# Patient Record
Sex: FEMALE | Race: OTHER | URBAN - METROPOLITAN AREA
[De-identification: names, ages, dates, MRNs, and addresses within clinical notes are randomized per-mention and may not be internally consistent; named-entity substitution may affect disease eponyms.]

---

## 2017-07-07 ENCOUNTER — IMPORTED ENCOUNTER (OUTPATIENT)
Dept: URBAN - METROPOLITAN AREA CLINIC 50 | Facility: CLINIC | Age: 72
End: 2017-07-07

## 2017-12-11 ENCOUNTER — IMPORTED ENCOUNTER (OUTPATIENT)
Dept: URBAN - METROPOLITAN AREA CLINIC 50 | Facility: CLINIC | Age: 72
End: 2017-12-11

## 2018-02-09 ENCOUNTER — IMPORTED ENCOUNTER (OUTPATIENT)
Dept: URBAN - METROPOLITAN AREA CLINIC 50 | Facility: CLINIC | Age: 73
End: 2018-02-09

## 2018-02-13 ENCOUNTER — IMPORTED ENCOUNTER (OUTPATIENT)
Dept: URBAN - METROPOLITAN AREA CLINIC 50 | Facility: CLINIC | Age: 73
End: 2018-02-13

## 2018-04-17 ENCOUNTER — IMPORTED ENCOUNTER (OUTPATIENT)
Dept: URBAN - METROPOLITAN AREA CLINIC 50 | Facility: CLINIC | Age: 73
End: 2018-04-17

## 2018-04-20 ENCOUNTER — IMPORTED ENCOUNTER (OUTPATIENT)
Dept: URBAN - METROPOLITAN AREA CLINIC 50 | Facility: CLINIC | Age: 73
End: 2018-04-20

## 2018-05-02 ENCOUNTER — IMPORTED ENCOUNTER (OUTPATIENT)
Dept: URBAN - METROPOLITAN AREA CLINIC 50 | Facility: CLINIC | Age: 73
End: 2018-05-02

## 2018-05-25 ENCOUNTER — IMPORTED ENCOUNTER (OUTPATIENT)
Dept: URBAN - METROPOLITAN AREA CLINIC 50 | Facility: CLINIC | Age: 73
End: 2018-05-25

## 2018-05-30 ENCOUNTER — IMPORTED ENCOUNTER (OUTPATIENT)
Dept: URBAN - METROPOLITAN AREA CLINIC 50 | Facility: CLINIC | Age: 73
End: 2018-05-30

## 2018-05-30 NOTE — PATIENT DISCUSSION
"""S/P IOL OS: Krista [id number] 26.5 (ORA) @ 90Âº +ORA/Femto +TM/Omidria. Continue post operative instructions and drops per schedule.  """

## 2018-05-31 ENCOUNTER — IMPORTED ENCOUNTER (OUTPATIENT)
Dept: URBAN - METROPOLITAN AREA CLINIC 50 | Facility: CLINIC | Age: 73
End: 2018-05-31

## 2018-06-08 ENCOUNTER — IMPORTED ENCOUNTER (OUTPATIENT)
Dept: URBAN - METROPOLITAN AREA CLINIC 50 | Facility: CLINIC | Age: 73
End: 2018-06-08

## 2018-06-08 NOTE — PATIENT DISCUSSION
"""S/P IOL OS: Krista [id number] 26.5 (ORA) @ 90Âº +ORA/Femto +TM/Omidria. Continue post operative instructions and drops per schedule.  """

## 2018-06-13 ENCOUNTER — IMPORTED ENCOUNTER (OUTPATIENT)
Dept: URBAN - METROPOLITAN AREA CLINIC 50 | Facility: CLINIC | Age: 73
End: 2018-06-13

## 2018-06-13 NOTE — PATIENT DISCUSSION
"""S/P IOL OD: Tecaquilino [id number] 25.5 @ 87Âº +Femto +TM. Continue post operative instructions and drops per schedule.  """

## 2018-06-25 ENCOUNTER — IMPORTED ENCOUNTER (OUTPATIENT)
Dept: URBAN - METROPOLITAN AREA CLINIC 50 | Facility: CLINIC | Age: 73
End: 2018-06-25

## 2018-07-12 ENCOUNTER — IMPORTED ENCOUNTER (OUTPATIENT)
Dept: URBAN - METROPOLITAN AREA CLINIC 50 | Facility: CLINIC | Age: 73
End: 2018-07-12

## 2018-07-16 ENCOUNTER — IMPORTED ENCOUNTER (OUTPATIENT)
Dept: URBAN - METROPOLITAN AREA CLINIC 50 | Facility: CLINIC | Age: 73
End: 2018-07-16

## 2018-07-16 NOTE — PATIENT DISCUSSION
"""S/P IOL OU: OD: Tecnis [id number] 25.5 @ 87ÂºFemto +TMOmidria. OS: Tecnis [id number] 26.5 (ORA) @ 90Âº +ORA +TM. MRx given today. "

## 2018-12-14 ENCOUNTER — IMPORTED ENCOUNTER (OUTPATIENT)
Dept: URBAN - METROPOLITAN AREA CLINIC 50 | Facility: CLINIC | Age: 73
End: 2018-12-14

## 2020-05-15 ENCOUNTER — IMPORTED ENCOUNTER (OUTPATIENT)
Dept: URBAN - METROPOLITAN AREA CLINIC 50 | Facility: CLINIC | Age: 75
End: 2020-05-15

## 2020-06-03 ENCOUNTER — IMPORTED ENCOUNTER (OUTPATIENT)
Dept: URBAN - METROPOLITAN AREA CLINIC 50 | Facility: CLINIC | Age: 75
End: 2020-06-03

## 2021-04-18 ASSESSMENT — TONOMETRY
OD_IOP_MMHG: 19
OS_IOP_MMHG: 15
OD_IOP_MMHG: 16
OD_IOP_MMHG: 12
OD_IOP_MMHG: 16
OD_IOP_MMHG: 17
OD_IOP_MMHG: 14
OS_IOP_MMHG: 12
OD_IOP_MMHG: 18
OS_IOP_MMHG: 17
OD_IOP_MMHG: 28
OS_IOP_MMHG: 18
OS_IOP_MMHG: 16
OS_IOP_MMHG: 15
OD_IOP_MMHG: 18
OS_IOP_MMHG: 12
OS_IOP_MMHG: 17
OS_IOP_MMHG: 16
OD_IOP_MMHG: 14
OS_IOP_MMHG: 14
OS_IOP_MMHG: 47
OD_IOP_MMHG: 16

## 2021-04-18 ASSESSMENT — VISUAL ACUITY
OS_SC: 20/30+
OS_CC: 20/60-
OS_SC: 20/25+2
OD_CC: J1+
OS_CC: J1+
OD_BAT: 20/40
OS_BAT: 20/60-
OS_OTHER: 20/25. 20/40.
OS_SC: 20/30+
OD_OTHER: 20/60-. 20/400.
OD_CC: J1+
OD_SC: 20/20
OD_OTHER: 20/30. 20/50.
OD_BAT: 20/40
OS_OTHER: 20/70. 20/80-.
OD_OTHER: 20/20. 20/25.
OS_CC: J1+
OS_SC: 20/20
OS_OTHER: 20/60-. 20/400.
OD_CC: J1+
OS_BAT: 20/70
OD_SC: 20/80
OS_PH: 20/25
OS_BAT: 20/20
OS_OTHER: 20/70. 20/70.
OS_SC: 20/40-
OD_SC: 20/20-
OD_SC: 20/20-3
OD_SC: 20/20-
OD_SC: 20/25
OD_BAT: 20/60-
OD_OTHER: 20/40. 20/50.
OS_OTHER: 20/20. 20/25.
OS_SC: 20/25
OS_BAT: 20/25
OS_CC: J1+
OD_BAT: 20/20
OD_BAT: 20/40
OD_SC: 20/25+
OD_SC: 20/20
OD_OTHER: 20/40. 20/50.
OD_OTHER: 20/40. 20/50.
OS_SC: 20/25+
OS_SC: 20/30+
OS_PH: 20/30
OS_BAT: 20/70
OD_BAT: 20/30
OS_SC: 20/25-
OD_SC: 20/20-
OD_SC: 20/25-